# Patient Record
Sex: FEMALE | Race: WHITE | NOT HISPANIC OR LATINO | URBAN - METROPOLITAN AREA
[De-identification: names, ages, dates, MRNs, and addresses within clinical notes are randomized per-mention and may not be internally consistent; named-entity substitution may affect disease eponyms.]

---

## 2024-09-09 ENCOUNTER — APPOINTMENT (OUTPATIENT)
Dept: PRIMARY CARE | Facility: CLINIC | Age: 64
End: 2024-09-09

## 2024-09-09 VITALS
OXYGEN SATURATION: 100 % | HEART RATE: 58 BPM | SYSTOLIC BLOOD PRESSURE: 110 MMHG | HEIGHT: 68 IN | DIASTOLIC BLOOD PRESSURE: 60 MMHG | BODY MASS INDEX: 22.73 KG/M2 | WEIGHT: 150 LBS

## 2024-09-09 DIAGNOSIS — Z00.00 MEDICARE ANNUAL WELLNESS VISIT, INITIAL: Primary | ICD-10-CM

## 2024-09-09 DIAGNOSIS — D50.9 IRON DEFICIENCY ANEMIA, UNSPECIFIED IRON DEFICIENCY ANEMIA TYPE: Chronic | ICD-10-CM

## 2024-09-09 DIAGNOSIS — Z13.29 THYROID DISORDER SCREENING: ICD-10-CM

## 2024-09-09 DIAGNOSIS — M25.512 CHRONIC LEFT SHOULDER PAIN: ICD-10-CM

## 2024-09-09 DIAGNOSIS — I47.19 AVNRT (AV NODAL RE-ENTRY TACHYCARDIA) (CMS-HCC): Chronic | ICD-10-CM

## 2024-09-09 DIAGNOSIS — R25.2 LEG CRAMPS: Chronic | ICD-10-CM

## 2024-09-09 DIAGNOSIS — L65.9 HAIR LOSS: ICD-10-CM

## 2024-09-09 DIAGNOSIS — G89.29 CHRONIC LEFT SHOULDER PAIN: ICD-10-CM

## 2024-09-09 DIAGNOSIS — I47.9 PAROXYSMAL TACHYCARDIA (MULTI): ICD-10-CM

## 2024-09-09 DIAGNOSIS — R73.9 HYPERGLYCEMIA: ICD-10-CM

## 2024-09-09 DIAGNOSIS — R53.83 OTHER FATIGUE: ICD-10-CM

## 2024-09-09 LAB
BASOPHILS # BLD AUTO: 0.02 X10*3/UL (ref 0–0.1)
BASOPHILS NFR BLD AUTO: 0.3 %
EOSINOPHIL # BLD AUTO: 0.15 X10*3/UL (ref 0–0.7)
EOSINOPHIL NFR BLD AUTO: 2.5 %
ERYTHROCYTE [DISTWIDTH] IN BLOOD BY AUTOMATED COUNT: 12.8 % (ref 11.5–14.5)
EST. AVERAGE GLUCOSE BLD GHB EST-MCNC: 103 MG/DL
HBA1C MFR BLD: 5.2 %
HCT VFR BLD AUTO: 38 % (ref 36–46)
HGB BLD-MCNC: 12.3 G/DL (ref 12–16)
IMM GRANULOCYTES # BLD AUTO: 0.02 X10*3/UL (ref 0–0.7)
IMM GRANULOCYTES NFR BLD AUTO: 0.3 % (ref 0–0.9)
IRON SATN MFR SERPL: 23 % (ref 25–45)
IRON SERPL-MCNC: 68 UG/DL (ref 35–150)
LYMPHOCYTES # BLD AUTO: 1.62 X10*3/UL (ref 1.2–4.8)
LYMPHOCYTES NFR BLD AUTO: 27.4 %
MCH RBC QN AUTO: 30 PG (ref 26–34)
MCHC RBC AUTO-ENTMCNC: 32.4 G/DL (ref 32–36)
MCV RBC AUTO: 93 FL (ref 80–100)
MONOCYTES # BLD AUTO: 0.54 X10*3/UL (ref 0.1–1)
MONOCYTES NFR BLD AUTO: 9.1 %
NEUTROPHILS # BLD AUTO: 3.57 X10*3/UL (ref 1.2–7.7)
NEUTROPHILS NFR BLD AUTO: 60.4 %
NRBC BLD-RTO: 0 /100 WBCS (ref 0–0)
PLATELET # BLD AUTO: 200 X10*3/UL (ref 150–450)
RBC # BLD AUTO: 4.1 X10*6/UL (ref 4–5.2)
TIBC SERPL-MCNC: 299 UG/DL (ref 240–445)
TSH SERPL-ACNC: 0.67 MIU/L (ref 0.44–3.98)
UIBC SERPL-MCNC: 231 UG/DL (ref 110–370)
WBC # BLD AUTO: 5.9 X10*3/UL (ref 4.4–11.3)

## 2024-09-09 PROCEDURE — 84443 ASSAY THYROID STIM HORMONE: CPT

## 2024-09-09 PROCEDURE — 1160F RVW MEDS BY RX/DR IN RCRD: CPT | Performed by: STUDENT IN AN ORGANIZED HEALTH CARE EDUCATION/TRAINING PROGRAM

## 2024-09-09 PROCEDURE — 83550 IRON BINDING TEST: CPT

## 2024-09-09 PROCEDURE — 1036F TOBACCO NON-USER: CPT | Performed by: STUDENT IN AN ORGANIZED HEALTH CARE EDUCATION/TRAINING PROGRAM

## 2024-09-09 PROCEDURE — 85025 COMPLETE CBC W/AUTO DIFF WBC: CPT

## 2024-09-09 PROCEDURE — G0438 PPPS, INITIAL VISIT: HCPCS | Performed by: STUDENT IN AN ORGANIZED HEALTH CARE EDUCATION/TRAINING PROGRAM

## 2024-09-09 PROCEDURE — 3008F BODY MASS INDEX DOCD: CPT | Performed by: STUDENT IN AN ORGANIZED HEALTH CARE EDUCATION/TRAINING PROGRAM

## 2024-09-09 PROCEDURE — 83036 HEMOGLOBIN GLYCOSYLATED A1C: CPT

## 2024-09-09 PROCEDURE — 1124F ACP DISCUSS-NO DSCNMKR DOCD: CPT | Performed by: STUDENT IN AN ORGANIZED HEALTH CARE EDUCATION/TRAINING PROGRAM

## 2024-09-09 PROCEDURE — 99204 OFFICE O/P NEW MOD 45 MIN: CPT | Performed by: STUDENT IN AN ORGANIZED HEALTH CARE EDUCATION/TRAINING PROGRAM

## 2024-09-09 PROCEDURE — 1159F MED LIST DOCD IN RCRD: CPT | Performed by: STUDENT IN AN ORGANIZED HEALTH CARE EDUCATION/TRAINING PROGRAM

## 2024-09-09 PROCEDURE — 83540 ASSAY OF IRON: CPT

## 2024-09-09 PROCEDURE — 1170F FXNL STATUS ASSESSED: CPT | Performed by: STUDENT IN AN ORGANIZED HEALTH CARE EDUCATION/TRAINING PROGRAM

## 2024-09-09 RX ORDER — METOPROLOL SUCCINATE 25 MG/1
75 TABLET, EXTENDED RELEASE ORAL
COMMUNITY
Start: 2023-09-11

## 2024-09-09 RX ORDER — AMLODIPINE BESYLATE 5 MG/1
TABLET ORAL
COMMUNITY
Start: 2024-05-14

## 2024-09-09 RX ORDER — CYCLOBENZAPRINE HCL 5 MG
TABLET ORAL
COMMUNITY

## 2024-09-09 RX ORDER — FUROSEMIDE 20 MG/1
20 TABLET ORAL DAILY PRN
COMMUNITY

## 2024-09-09 RX ORDER — MELOXICAM 15 MG/1
15 TABLET ORAL
COMMUNITY
Start: 2024-06-10

## 2024-09-09 ASSESSMENT — ENCOUNTER SYMPTOMS
ARTHRALGIAS: 1
DIARRHEA: 0
SHORTNESS OF BREATH: 0
PALPITATIONS: 1
CHEST TIGHTNESS: 0
HEADACHES: 0
CONSTIPATION: 0
SLEEP DISTURBANCE: 0
NERVOUS/ANXIOUS: 0
DIFFICULTY URINATING: 0
UNEXPECTED WEIGHT CHANGE: 0
FATIGUE: 1
WHEEZING: 0
ABDOMINAL PAIN: 0
DYSPHORIC MOOD: 0
DIZZINESS: 0
SINUS PRESSURE: 0
LIGHT-HEADEDNESS: 0

## 2024-09-09 ASSESSMENT — PATIENT HEALTH QUESTIONNAIRE - PHQ9
1. LITTLE INTEREST OR PLEASURE IN DOING THINGS: NOT AT ALL
SUM OF ALL RESPONSES TO PHQ9 QUESTIONS 1 AND 2: 0
1. LITTLE INTEREST OR PLEASURE IN DOING THINGS: NOT AT ALL
2. FEELING DOWN, DEPRESSED OR HOPELESS: NOT AT ALL
2. FEELING DOWN, DEPRESSED OR HOPELESS: NOT AT ALL
SUM OF ALL RESPONSES TO PHQ9 QUESTIONS 1 AND 2: 0

## 2024-09-09 ASSESSMENT — ACTIVITIES OF DAILY LIVING (ADL)
TAKING_MEDICATION: INDEPENDENT
BATHING: INDEPENDENT
DRESSING: INDEPENDENT
DOING_HOUSEWORK: INDEPENDENT
GROCERY_SHOPPING: INDEPENDENT
MANAGING_FINANCES: INDEPENDENT

## 2024-09-09 NOTE — PROGRESS NOTES
Subjective   Patient ID: Giovanna Chahal is a 64 y.o. female who presents for Medicare Annual Wellness Visit Initial (Medicare annual wellness initial /New patient /Joint and back pain, fatigue/).  Establish care.     Complains of chronic joint pains and muscle aches. Takes mobic, tylenol, and cyclobenzaprine nightly.     Reports prior flu shot many years ago which caused nerve damage in her right shoulder. [facility]     Hx of AVNRT s/p ablation 2010. Stable on metropolol, has had this increased a few times.     [facility].     Reports prior low iron, was taking oral iron for about one week.     Does not sleep well. Secondary to pain.     Complains of hair falling out.     Drinks 3 eight oz glasses of water per day.     Works out 2x per week. Reports also walks her dog every day.     Has mammogram scheduled. Has colonoscopy scheduled.     Reports hx of mold poisoning from faucets at her condo.     No other concerns today.         Review of Systems   Constitutional:  Positive for fatigue. Negative for unexpected weight change.   HENT:  Negative for congestion, ear pain and sinus pressure.    Eyes:  Negative for visual disturbance.   Respiratory:  Negative for chest tightness, shortness of breath and wheezing.    Cardiovascular:  Positive for palpitations. Negative for chest pain and leg swelling.   Gastrointestinal:  Negative for abdominal pain, constipation and diarrhea.   Genitourinary:  Negative for difficulty urinating.   Musculoskeletal:  Positive for arthralgias.   Skin:  Negative for rash.   Neurological:  Negative for dizziness, light-headedness and headaches.   Psychiatric/Behavioral:  Negative for dysphoric mood and sleep disturbance. The patient is not nervous/anxious.    All other systems reviewed and are negative.      Objective   Physical Exam  Vitals reviewed.   Constitutional:       General: She is not in acute distress.  HENT:      Head:  Normocephalic.      Right Ear: External ear normal.      Left Ear: External ear normal.      Nose: Nose normal.   Eyes:      Extraocular Movements: Extraocular movements intact.      Pupils: Pupils are equal, round, and reactive to light.   Cardiovascular:      Rate and Rhythm: Normal rate and regular rhythm.      Heart sounds: Normal heart sounds.   Pulmonary:      Effort: Pulmonary effort is normal.      Breath sounds: Normal breath sounds.   Abdominal:      Palpations: Abdomen is soft.   Musculoskeletal:         General: Normal range of motion.      Cervical back: Normal range of motion and neck supple.   Skin:     General: Skin is warm and dry.   Neurological:      Mental Status: She is alert. Mental status is at baseline.   Psychiatric:         Mood and Affect: Mood normal.         Behavior: Behavior normal.         Body mass index is 22.81 kg/m².      Current Outpatient Medications:     amLODIPine (Norvasc) 5 mg tablet, Take by mouth., Disp: , Rfl:     cyclobenzaprine (Flexeril) 5 mg tablet, Take by mouth., Disp: , Rfl:     furosemide (Lasix) 20 mg tablet, Take 1 tablet (20 mg) by mouth once daily as needed., Disp: , Rfl:     meloxicam (Mobic) 15 mg tablet, Take 1 tablet (15 mg) by mouth once daily., Disp: , Rfl:     metoprolol succinate XL (Toprol-XL) 25 mg 24 hr tablet, Take 3 tablets (75 mg) by mouth., Disp: , Rfl:       Assessment/Plan   Diagnoses and all orders for this visit:  Medicare annual wellness visit, initial  Comments:  mammogram scheduled  colonoscopy scheduled  Chronic left shoulder pain  Comments:  referral for ortho for steroid injections  Orders:  -     Referral to Orthopaedic Surgery; Future  Paroxysmal tachycardia (Multi)  AVNRT (AV ramón re-entry tachycardia) (CMS-HCC)  Comments:  s/p ablation  concerned about her dose of 75mg metoprolol  reassurance provided  Iron deficiency anemia, unspecified iron deficiency anemia type  Comments:  check CBC and iron level  Orders:  -     Iron and  TIBC  -     CBC and Auto Differential  Other fatigue  -     TSH with reflex to Free T4 if abnormal  -     Hemoglobin A1c  Thyroid disorder screening  -     TSH with reflex to Free T4 if abnormal  Hair loss  Hyperglycemia  -     Hemoglobin A1c  Leg cramps  Comments:  check iron levels  encourage increasing hydration    Follow up in 6 months       Farida Diaz,  09/09/24 4:49 PM

## 2024-09-13 ENCOUNTER — HOSPITAL ENCOUNTER (OUTPATIENT)
Dept: RADIOLOGY | Facility: CLINIC | Age: 64
Discharge: HOME | End: 2024-09-13
Payer: COMMERCIAL

## 2024-09-13 VITALS — BODY MASS INDEX: 22.73 KG/M2 | HEIGHT: 68 IN | WEIGHT: 150 LBS

## 2024-09-13 DIAGNOSIS — Z12.31 SCREENING MAMMOGRAM FOR BREAST CANCER: ICD-10-CM

## 2024-09-13 PROCEDURE — 77067 SCR MAMMO BI INCL CAD: CPT

## 2024-09-15 ENCOUNTER — PATIENT MESSAGE (OUTPATIENT)
Dept: PRIMARY CARE | Facility: CLINIC | Age: 64
End: 2024-09-15
Payer: COMMERCIAL

## 2024-09-15 DIAGNOSIS — G89.29 CHRONIC LEFT SHOULDER PAIN: Primary | ICD-10-CM

## 2024-09-15 DIAGNOSIS — M25.512 CHRONIC LEFT SHOULDER PAIN: Primary | ICD-10-CM

## 2024-09-16 RX ORDER — MELOXICAM 15 MG/1
15 TABLET ORAL
Qty: 30 TABLET | Refills: 1 | Status: SHIPPED | OUTPATIENT
Start: 2024-09-16

## 2024-09-18 ENCOUNTER — HOSPITAL ENCOUNTER (OUTPATIENT)
Dept: RADIOLOGY | Facility: CLINIC | Age: 64
Discharge: HOME | End: 2024-09-18
Payer: COMMERCIAL

## 2024-09-18 ENCOUNTER — OFFICE VISIT (OUTPATIENT)
Dept: ORTHOPEDIC SURGERY | Facility: CLINIC | Age: 64
End: 2024-09-18
Payer: COMMERCIAL

## 2024-09-18 VITALS — BODY MASS INDEX: 21.98 KG/M2 | WEIGHT: 145 LBS | HEIGHT: 68 IN

## 2024-09-18 DIAGNOSIS — G89.29 CHRONIC LEFT SHOULDER PAIN: ICD-10-CM

## 2024-09-18 DIAGNOSIS — M75.42 ROTATOR CUFF IMPINGEMENT SYNDROME OF LEFT SHOULDER: Primary | ICD-10-CM

## 2024-09-18 DIAGNOSIS — M19.012 OSTEOARTHRITIS OF GLENOHUMERAL JOINT, LEFT: ICD-10-CM

## 2024-09-18 DIAGNOSIS — M25.512 ACUTE PAIN OF LEFT SHOULDER: ICD-10-CM

## 2024-09-18 DIAGNOSIS — M25.512 CHRONIC LEFT SHOULDER PAIN: ICD-10-CM

## 2024-09-18 PROCEDURE — 20610 DRAIN/INJ JOINT/BURSA W/O US: CPT | Mod: LT | Performed by: FAMILY MEDICINE

## 2024-09-18 PROCEDURE — 73030 X-RAY EXAM OF SHOULDER: CPT | Mod: LEFT SIDE | Performed by: RADIOLOGY

## 2024-09-18 PROCEDURE — 1036F TOBACCO NON-USER: CPT | Performed by: FAMILY MEDICINE

## 2024-09-18 PROCEDURE — 73030 X-RAY EXAM OF SHOULDER: CPT | Mod: LT

## 2024-09-18 PROCEDURE — 1159F MED LIST DOCD IN RCRD: CPT | Performed by: FAMILY MEDICINE

## 2024-09-18 PROCEDURE — 3008F BODY MASS INDEX DOCD: CPT | Performed by: FAMILY MEDICINE

## 2024-09-18 PROCEDURE — 99213 OFFICE O/P EST LOW 20 MIN: CPT | Mod: 25 | Performed by: FAMILY MEDICINE

## 2024-09-18 PROCEDURE — 99203 OFFICE O/P NEW LOW 30 MIN: CPT | Performed by: FAMILY MEDICINE

## 2024-09-18 PROCEDURE — 1160F RVW MEDS BY RX/DR IN RCRD: CPT | Performed by: FAMILY MEDICINE

## 2024-09-18 RX ORDER — TRIAMCINOLONE ACETONIDE 40 MG/ML
40 INJECTION, SUSPENSION INTRA-ARTICULAR; INTRAMUSCULAR
Status: COMPLETED | OUTPATIENT
Start: 2024-09-18 | End: 2024-09-18

## 2024-09-18 RX ORDER — ACETAMINOPHEN 500 MG
1000 TABLET ORAL DAILY
COMMUNITY

## 2024-09-18 RX ORDER — LIDOCAINE HYDROCHLORIDE 20 MG/ML
2 INJECTION, SOLUTION INFILTRATION; PERINEURAL
Status: COMPLETED | OUTPATIENT
Start: 2024-09-18 | End: 2024-09-18

## 2024-09-18 RX ORDER — MELATONIN 1 MG
TABLET,CHEWABLE ORAL
COMMUNITY

## 2024-09-18 NOTE — PROGRESS NOTES
History of Present Illness   Chief Complaint   Patient presents with    OTHER     LT SHOULDER PAIN FOR YEARS  NKI       The patient is 64 y.o. right-hand-dominant female  here with a complaint of left shoulder pain.  Patient referred by PCP Farida Diaz.  Patient has been dealing some chronic left shoulder pain for many years, symptoms initially started after a flu vaccine, did have some pretty severe pain following this which did improve with time but has intermittent shoulder pain since.  She has been following with outside [facility], she has been getting as needed corticosteroid injections in the left shoulder which have been of benefit though she does admit to some diminishing efficacy, previous injections were providing more than a year of relief, last injection was 7 months ago with more recent recurrence of pain.  She admits to pain with overhead activity, she has pain with abduction maneuvers, she has pain laying on her side at night.  She does some popping and clicking of the shoulder.  She has done physical therapy in the past.  She says that she has had MRI in the past of her left shoulder was told there was some degenerative changes.  Pain deep within the shoulder, no radiation of pain, no neck pain, no upper extremity numbness or tingling.    No past medical history on file.    Medication Documentation Review Audit       Reviewed by Farida Diaz DO (Physician) on 09/09/24 at 1416      Medication Order Taking? Sig Documenting Provider Last Dose Status   amLODIPine (Norvasc) 5 mg tablet [id number] Yes Take by mouth. Historical Provider, MD Taking Active   cyclobenzaprine (Flexeril) 5 mg tablet [id number] Yes Take by mouth. Historical Provider, MD Taking Active   furosemide (Lasix) 20 mg tablet [id number] Yes Take 1 tablet (20 mg) by mouth once daily as needed. Historical Provider, MD Taking Active   meloxicam (Mobic) 15 mg tablet [id number] Yes Take 1 tablet (15 mg)  by mouth once daily. Historical Provider, MD Taking Active   metoprolol succinate XL (Toprol-XL) 25 mg 24 hr tablet [id number] Yes Take 3 tablets (75 mg) by mouth. Historical Provider, MD Taking Active                    Allergies   Allergen Reactions    Lisinopril Cough    Influenza Virus Vaccine, Specific Other    Tetracyclines Other     experiences hearing loss    Penicillins Rash    Sulfamethoxazole-Trimethoprim Dermatitis, Rash and Other       Social History     Socioeconomic History    Marital status:      Spouse name: Not on file    Number of children: Not on file    Years of education: Not on file    Highest education level: Not on file   Occupational History    Not on file   Tobacco Use    Smoking status: Never    Smokeless tobacco: Never   Substance and Sexual Activity    Alcohol use: Not on file    Drug use: Not on file    Sexual activity: Not on file   Other Topics Concern    Not on file   Social History Narrative    Not on file     Social Determinants of Health     Financial Resource Strain: Not on file   Food Insecurity: Not on file   Transportation Needs: Not on file   Physical Activity: Not on file   Stress: Not on file   Social Connections: Not on file   Intimate Partner Violence: Not on file   Housing Stability: Not on file       No past surgical history on file.       Review of Systems   GENERAL: Negative  GI: Negative  MUSCULOSKELETAL: See HPI  SKIN: Negative  NEURO:  Negative     Physical Exam:    General/Constitutional: well appearing, no distress, appears stated age  HEENT: sclera clear  Respiratory: non labored breathing  Vascular: No edema, swelling or tenderness, except as noted in detailed exam.  Integumentary: No impressive skin lesions present, except as noted in detailed exam.  Neurological:  Alert and oriented   Psychological:  Normal mood and affect.  Musculoskeletal: Normal, except as noted in detailed exam and in HPI    Left shoulder: Normal appearance, no muscle atrophy, no  skin changes.  There is some mild tenderness at the subacromial space.  She has relatively preserved range of motion, equal to the right, forward flexion and abduction 160 degrees, internal rotation mid thoracic spine.  No significant motor deficits are present with rotator cuff strength testing which she does have pain with resisted abduction and external rotation.  She has pain with Hernandez and Neer's.  Positive [place].  Negative Speed, negative Yergason.  No shoulder instability.  There is some crepitus with passive range of motion of the shoulder.       Imaging: X-rays of left shoulder obtained today and independently reviewed, she does have some mild to moderate degenerative changes of the glenohumeral joint, no acute abnormalities are seen, mild degenerative changes of the AC joint      L Inj/Asp: L subacromial bursa on 9/18/2024 3:22 PM  Indications: pain  Details: 22 G needle, posterior approach  Medications: 40 mg triamcinolone acetonide 40 mg/mL; 2 mL lidocaine 20 mg/mL (2 %)  Outcome: tolerated well, no immediate complications  Procedure, treatment alternatives, risks and benefits explained, specific risks discussed. Consent was given by the patient. Immediately prior to procedure a time out was called to verify the correct patient, procedure, equipment, support staff and site/side marked as required. Patient was prepped and draped in the usual sterile fashion.             Assessment   1. Rotator cuff impingement syndrome of left shoulder        2. Osteoarthritis of glenohumeral joint, left        3. Chronic left shoulder pain  XR shoulder left 2+ views    Referral to Orthopaedic Surgery    referral for ortho for steroid injections            Plan: Chronic left shoulder pain, symptoms thought to be secondary to commendation of underlying osteoarthritis of the glenohumeral joint, also has some signs of rotator cuff impingement.  Has had good response to previous shoulder injections which sound like  subacromial injections under palpation guidance, somewhat diminishing response to these injections, last injection 7 months ago.  We discussed further workup and treatment.  We did proceed with subacromial injection with Kenalog today.  She has done physical therapy in the past, she will continue with her home exercises.  I would like to see her back in 1 month for reassessment.  Could consider ultrasound-guided glenohumeral joint injection at some point.

## 2024-09-20 DIAGNOSIS — R92.8 ABNORMAL MAMMOGRAM: Primary | ICD-10-CM

## 2024-09-20 NOTE — PROGRESS NOTES
Subjective   Patient ID: Giovanna Chahal is a 64 y.o. female who presents for No chief complaint on file..  HPI    Review of Systems    Objective   Physical Exam    Assessment/Plan            Farida Diaz DO 09/20/24 3:46 PM

## 2024-10-16 ENCOUNTER — OFFICE VISIT (OUTPATIENT)
Dept: ORTHOPEDIC SURGERY | Facility: CLINIC | Age: 64
End: 2024-10-16
Payer: COMMERCIAL

## 2024-10-16 DIAGNOSIS — M19.012 OSTEOARTHRITIS OF GLENOHUMERAL JOINT, LEFT: ICD-10-CM

## 2024-10-16 DIAGNOSIS — M75.42 ROTATOR CUFF IMPINGEMENT SYNDROME OF LEFT SHOULDER: Primary | ICD-10-CM

## 2024-10-16 PROCEDURE — 1036F TOBACCO NON-USER: CPT | Performed by: FAMILY MEDICINE

## 2024-10-16 PROCEDURE — 1159F MED LIST DOCD IN RCRD: CPT | Performed by: FAMILY MEDICINE

## 2024-10-16 PROCEDURE — 99213 OFFICE O/P EST LOW 20 MIN: CPT | Performed by: FAMILY MEDICINE

## 2024-10-16 PROCEDURE — 1160F RVW MEDS BY RX/DR IN RCRD: CPT | Performed by: FAMILY MEDICINE

## 2024-10-16 NOTE — PROGRESS NOTES
History of Present Illness   Chief Complaint   Patient presents with    Left Shoulder - Follow-up       The patient is 64 y.o. right-hand-dominant female  here for follow-up of left shoulder pain.  Patient was seen by me 1 month ago.  See previous note for full details of history.  In brief patient has been dealing with some chronic left shoulder pain for several years, was [facility]  Treatment has been conservative with as needed corticosteroid injections with somewhat diminishing response.  At her initial visit she had x-rays that showed some moderate degenerative changes of the glenohumeral joint.  Symptoms thought to be multifactorial commendation of arthritic changes of the shoulder as well as some rotator cuff impingement.  We opted to treat with subacromial injection at that visit.  Patient says that she has had near full relief of symptoms, says she is doing well today with no current shoulder pain.  She is planning to get back into her weight training routine, says she does typically modify her shoulder workouts because of concern for aggravation of shoulder pain.  She has done physical therapy in the past as well, she does do some home exercises for her shoulder.      No past medical history on file.    Medication Documentation Review Audit       Reviewed by Perico Pereira MD (Physician) on 09/18/24 at 1522      Medication Order Taking? Sig Documenting Provider Last Dose Status   amLODIPine (Norvasc) 5 mg tablet [id number] Yes Take by mouth. Historical Provider, MD Taking Active   cyclobenzaprine (Flexeril) 5 mg tablet [id number] Yes Take by mouth. Historical Provider, MD Taking Active   furosemide (Lasix) 20 mg tablet [id number]  Take 1 tablet (20 mg) by mouth once daily as needed. Historical Provider, MD  Active   melatonin 1 mg tablet,chewable [id number] Yes Chew. Historical Provider, MD Taking Active     Discontinued 09/16/24 [id number]   meloxicam (Mobic) 15 mg  tablet [id number] Yes Take 1 tablet (15 mg) by mouth once daily. Farida Diaz,  Taking Active   metoprolol succinate XL (Toprol-XL) 25 mg 24 hr tablet [id number] Yes Take 3 tablets (75 mg) by mouth. Historical Provider, MD Taking Active   multivit with minerals-folic acid-denise K-CoQ (GASTON Plus) 200 mcg-1,000 mcg-10 mg capsule [id number] Yes Take 1 capsule by mouth once daily. Historical Provider, MD Taking Active   omega-3 (Fish OiL) 300-1,000 mg capsule [id number] Yes Take 1 capsule (1,000 mg) by mouth once daily. Historical Provider, MD Taking Active                    Allergies   Allergen Reactions    Lisinopril Cough    Influenza Virus Vaccine, Specific Other    Tetracyclines Other     experiences hearing loss    Penicillins Rash    Sulfamethoxazole-Trimethoprim Dermatitis, Rash and Other       Social History     Socioeconomic History    Marital status:      Spouse name: Not on file    Number of children: Not on file    Years of education: Not on file    Highest education level: Not on file   Occupational History    Not on file   Tobacco Use    Smoking status: Never    Smokeless tobacco: Never   Substance and Sexual Activity    Alcohol use: Not on file    Drug use: Not on file    Sexual activity: Not on file   Other Topics Concern    Not on file   Social History Narrative    Not on file     Social Drivers of Health     Financial Resource Strain: Not on file   Food Insecurity: Not on file   Transportation Needs: Not on file   Physical Activity: Not on file   Stress: Not on file   Social Connections: Not on file   Intimate Partner Violence: Not on file   Housing Stability: Not on file       No past surgical history on file.       Review of Systems   GENERAL: Negative  GI: Negative  MUSCULOSKELETAL: See HPI  SKIN: Negative  NEURO:  Negative     Physical Exam:    General/Constitutional: well appearing, no distress, appears stated age  HEENT: sclera clear  Respiratory: non labored breathing  Vascular: No  edema, swelling or tenderness, except as noted in detailed exam.  Integumentary: No impressive skin lesions present, except as noted in detailed exam.  Neurological:  Alert and oriented   Psychological:  Normal mood and affect.  Musculoskeletal: Normal, except as noted in detailed exam and in HPI    Left shoulder: Normal appearance, no muscle atrophy, no skin changes.  There is no tenderness at the subacromial space.  She has relatively preserved range of motion, equal to the right, forward flexion and abduction 160 degrees, internal rotation mid thoracic spine.  No pain or weakness with rotator cuff strength testing in any direction.  Negative Hernandez, negative Neer's, negative [place].  Negative Speed, negative Yergason.  No shoulder instability.  There is some crepitus with passive range of motion of the shoulder.    No new imaging today        Assessment   1. Rotator cuff impingement syndrome of left shoulder        2. Osteoarthritis of glenohumeral joint, left                Plan: Good response to subacromial injection with no residual pain in the left shoulder on today's visit.  Recommended continued conservative management.  She will return back to her weight training as tolerated by symptoms, avoiding aggravating exercises in the gym with regards to her shoulder.  We discussed treatment moving forward including as needed corticosteroid injection pending response to this injection though she has had many injections over the past few years.  Could consider other treatments including possible PRP.  We discussed consideration of updated MRI at some point, has been more than 5 years since her last MRI, no reports are available for review.  Patient will follow-up as symptoms dictate.

## 2024-10-29 ENCOUNTER — HOSPITAL ENCOUNTER (OUTPATIENT)
Dept: RADIOLOGY | Facility: CLINIC | Age: 64
Discharge: HOME | End: 2024-10-29
Payer: COMMERCIAL

## 2024-10-29 DIAGNOSIS — R92.8 ABNORMAL MAMMOGRAM: ICD-10-CM

## 2024-10-29 PROCEDURE — 77061 BREAST TOMOSYNTHESIS UNI: CPT | Mod: LT

## 2024-10-29 PROCEDURE — 77065 DX MAMMO INCL CAD UNI: CPT | Mod: LEFT SIDE | Performed by: STUDENT IN AN ORGANIZED HEALTH CARE EDUCATION/TRAINING PROGRAM

## 2024-10-29 PROCEDURE — G0279 TOMOSYNTHESIS, MAMMO: HCPCS | Mod: LEFT SIDE | Performed by: STUDENT IN AN ORGANIZED HEALTH CARE EDUCATION/TRAINING PROGRAM

## 2024-11-17 ENCOUNTER — PATIENT MESSAGE (OUTPATIENT)
Dept: PRIMARY CARE | Facility: CLINIC | Age: 64
End: 2024-11-17
Payer: COMMERCIAL

## 2024-11-17 DIAGNOSIS — I10 PRIMARY HYPERTENSION: Primary | ICD-10-CM

## 2024-11-18 RX ORDER — AMLODIPINE BESYLATE 5 MG/1
5 TABLET ORAL DAILY
Qty: 90 TABLET | Refills: 3 | Status: SHIPPED | OUTPATIENT
Start: 2024-11-18

## 2024-12-01 ENCOUNTER — PATIENT MESSAGE (OUTPATIENT)
Dept: PRIMARY CARE | Facility: CLINIC | Age: 64
End: 2024-12-01
Payer: COMMERCIAL

## 2024-12-01 DIAGNOSIS — I10 PRIMARY HYPERTENSION: Primary | ICD-10-CM

## 2024-12-02 RX ORDER — METOPROLOL SUCCINATE 25 MG/1
75 TABLET, EXTENDED RELEASE ORAL DAILY
Qty: 270 TABLET | Refills: 3 | Status: SHIPPED | OUTPATIENT
Start: 2024-12-02

## 2025-01-09 ENCOUNTER — PATIENT MESSAGE (OUTPATIENT)
Dept: PRIMARY CARE | Facility: CLINIC | Age: 65
End: 2025-01-09
Payer: COMMERCIAL

## 2025-01-09 DIAGNOSIS — M25.512 CHRONIC LEFT SHOULDER PAIN: ICD-10-CM

## 2025-01-09 DIAGNOSIS — G89.29 CHRONIC LEFT SHOULDER PAIN: ICD-10-CM

## 2025-01-09 RX ORDER — CYCLOBENZAPRINE HCL 5 MG
2.5 TABLET ORAL DAILY
Qty: 30 TABLET | Refills: 0 | Status: SHIPPED | OUTPATIENT
Start: 2025-01-09

## 2025-01-09 NOTE — PROGRESS NOTES
Subjective   Patient ID: Giovanna Chahal is a 64 y.o. female who presents for No chief complaint on file..  HPI    Review of Systems    Objective   Physical Exam    Assessment/Plan            Farida Diaz DO 01/09/25 4:39 PM

## 2025-02-05 ENCOUNTER — OFFICE VISIT (OUTPATIENT)
Dept: ORTHOPEDIC SURGERY | Facility: CLINIC | Age: 65
End: 2025-02-05
Payer: MEDICARE

## 2025-02-05 DIAGNOSIS — G89.29 CHRONIC LEFT SHOULDER PAIN: ICD-10-CM

## 2025-02-05 DIAGNOSIS — M25.512 CHRONIC LEFT SHOULDER PAIN: ICD-10-CM

## 2025-02-05 DIAGNOSIS — M75.42 ROTATOR CUFF IMPINGEMENT SYNDROME OF LEFT SHOULDER: Primary | ICD-10-CM

## 2025-02-05 PROCEDURE — 1160F RVW MEDS BY RX/DR IN RCRD: CPT | Performed by: FAMILY MEDICINE

## 2025-02-05 PROCEDURE — 99213 OFFICE O/P EST LOW 20 MIN: CPT | Mod: 25 | Performed by: FAMILY MEDICINE

## 2025-02-05 PROCEDURE — 1036F TOBACCO NON-USER: CPT | Performed by: FAMILY MEDICINE

## 2025-02-05 PROCEDURE — 20610 DRAIN/INJ JOINT/BURSA W/O US: CPT | Mod: LT | Performed by: FAMILY MEDICINE

## 2025-02-05 PROCEDURE — 1159F MED LIST DOCD IN RCRD: CPT | Performed by: FAMILY MEDICINE

## 2025-02-05 PROCEDURE — 2500000004 HC RX 250 GENERAL PHARMACY W/ HCPCS (ALT 636 FOR OP/ED): Performed by: FAMILY MEDICINE

## 2025-02-05 PROCEDURE — 99213 OFFICE O/P EST LOW 20 MIN: CPT | Performed by: FAMILY MEDICINE

## 2025-02-05 RX ORDER — TRIAMCINOLONE ACETONIDE 40 MG/ML
40 INJECTION, SUSPENSION INTRA-ARTICULAR; INTRAMUSCULAR
Status: COMPLETED | OUTPATIENT
Start: 2025-02-05 | End: 2025-02-05

## 2025-02-05 RX ORDER — LIDOCAINE HYDROCHLORIDE 20 MG/ML
2 INJECTION, SOLUTION INFILTRATION; PERINEURAL
Status: COMPLETED | OUTPATIENT
Start: 2025-02-05 | End: 2025-02-05

## 2025-02-05 RX ADMIN — TRIAMCINOLONE ACETONIDE 40 MG: 40 INJECTION, SUSPENSION INTRA-ARTICULAR; INTRAMUSCULAR at 11:38

## 2025-02-05 RX ADMIN — LIDOCAINE HYDROCHLORIDE 2 ML: 20 INJECTION, SOLUTION INFILTRATION; PERINEURAL at 11:38

## 2025-02-05 NOTE — PROGRESS NOTES
History of Present Illness   Chief Complaint   Patient presents with    Left Shoulder - Follow-up       The patient is 64 y.o. right-hand-dominant female  here for follow-up of left shoulder pain.  Patient was last seen by me 5 months ago, see previous note for full details.  Patient with history of some chronic left shoulder pain following with the [facility] in the past, she has had x-rays that did show some degenerative changes of the glenohumeral joint however symptoms more consistent with rotator cuff impingement, she did have subacromial injection with me in September, at 1 month follow-up she was doing well.  She has had several prior shoulder injections likely thought to be were subacromial injections in the past that have provided good but short-term relief, she has done physical therapy in the past as well.  She has had recurrence of pain over the past 6 weeks or so which she attributes to shoveling snow.  Similar to previous shoulder pain.  Pain over anterior lateral shoulder and upper arm, she has pain with overhead activity specifically with shoulder abduction, reaching behind her back, some pain laying on her shoulder.  No new injuries or symptoms since her last visit.  She states that she did have an MRI done at least 5 years ago that she said showed some degenerative changes, no definitive rotator cuff tear that she can recall.      No past medical history on file.    Medication Documentation Review Audit       Reviewed by Perico Pereira MD (Physician) on 10/16/24 at 0825      Medication Order Taking? Sig Documenting Provider Last Dose Status   amLODIPine (Norvasc) 5 mg tablet [id number] No Take by mouth. Historical Provider, MD Taking Active   cyclobenzaprine (Flexeril) 5 mg tablet [id number] No Take by mouth. Historical Provider, MD Taking Active   furosemide (Lasix) 20 mg tablet [id number] No Take 1 tablet (20 mg) by mouth once daily as needed. Historical Provider, MD Taking Active   melatonin 1  mg tablet,chewable [id number] No Chew. Historical Provider, MD Taking Active   meloxicam (Mobic) 15 mg tablet [id number] No Take 1 tablet (15 mg) by mouth once daily. Farida Diaz DO Taking Active   metoprolol succinate XL (Toprol-XL) 25 mg 24 hr tablet [id number] No Take 3 tablets (75 mg) by mouth. Historical Provider, MD Taking Active   multivit with minerals-folic acid-denise K-CoQ (GASTON Plus) 200 mcg-1,000 mcg-10 mg capsule [id number] No Take 1 capsule by mouth once daily. Historical Provider, MD Taking Active   omega-3 (Fish OiL) 300-1,000 mg capsule [id number] No Take 1 capsule (1,000 mg) by mouth once daily. Historical Provider, MD Taking Active                    Allergies   Allergen Reactions    Lisinopril Cough    Influenza Virus Vaccine, Specific Other    Tetracyclines Other     experiences hearing loss    Penicillins Rash    Sulfamethoxazole-Trimethoprim Dermatitis, Rash and Other       Social History     Socioeconomic History    Marital status:      Spouse name: Not on file    Number of children: Not on file    Years of education: Not on file    Highest education level: Not on file   Occupational History    Not on file   Tobacco Use    Smoking status: Never    Smokeless tobacco: Never   Substance and Sexual Activity    Alcohol use: Not on file    Drug use: Not on file    Sexual activity: Not on file   Other Topics Concern    Not on file   Social History Narrative    Not on file     Social Drivers of Health     Financial Resource Strain: Not on file   Food Insecurity: Not on file   Transportation Needs: Not on file   Physical Activity: Not on file   Stress: Not on file   Social Connections: Not on file   Intimate Partner Violence: Not on file   Housing Stability: Not on file       No past surgical history on file.       Review of Systems   GENERAL: Negative  GI: Negative  MUSCULOSKELETAL: See HPI  SKIN: Negative  NEURO:  Negative     Physical Exam:    General/Constitutional: well appearing, no  distress, appears stated age  HEENT: sclera clear  Respiratory: non labored breathing  Vascular: No edema, swelling or tenderness, except as noted in detailed exam.  Integumentary: No impressive skin lesions present, except as noted in detailed exam.  Neurological:  Alert and oriented   Psychological:  Normal mood and affect.  Musculoskeletal: Normal, except as noted in detailed exam and in HPI    Left shoulder: Normal appearance, no muscle atrophy, no skin changes.  There is no tenderness at the subacromial space.  Limited active range of motion, forward flexion 140 degrees, abduction 110 degrees with pain.  Internal rotation lumbar spine.  No passive range of motion deficits are present but there is pain and guarding at endrange.  No significant rotator cuff weakness, there is pain with resisted abduction.  There is pain with Hernandez and Neer's test.  Negative Speed, negative Yergason.      No new imaging today        L Inj/Asp: L subacromial bursa on 2/5/2025 11:38 AM  Indications: pain  Details: 22 G needle, posterior approach  Medications: 40 mg triamcinolone acetonide 40 mg/mL; 2 mL lidocaine 20 mg/mL (2 %)  Outcome: tolerated well, no immediate complications  Procedure, treatment alternatives, risks and benefits explained, specific risks discussed. Consent was given by the patient. Immediately prior to procedure a time out was called to verify the correct patient, procedure, equipment, support staff and site/side marked as required. Patient was prepped and draped in the usual sterile fashion.             Assessment   1. Rotator cuff impingement syndrome of left shoulder  Point of Care Ultrasound      2. Chronic left shoulder pain                Plan: Discussed further workup and treatment with patient, she was interested in repeat Kenalog injection which we did proceed with, see procedure note for full details.  We did again discuss potential risk of repetitive corticosteroid injections with regards to  weakening of the rotator cuff tendon.  We discussed possible MRI for further evaluation of the shoulder at some point, she would like to wait till later in the year until potentially more of her deductible has been paid.  She will continue with exercises at home, activities as tolerated, she will follow-up as needed.    ADDENDUM: Patient reached out to the office after visit stating that she would like to pursue shoulder MRI at this time which I think is reasonable given ongoing symptoms despite conservative management, order was placed for MRI, she will call to schedule, further treatment pending MRI results.

## 2025-02-06 DIAGNOSIS — M25.512 CHRONIC LEFT SHOULDER PAIN: Primary | ICD-10-CM

## 2025-02-06 DIAGNOSIS — M75.42 ROTATOR CUFF IMPINGEMENT SYNDROME OF LEFT SHOULDER: ICD-10-CM

## 2025-02-06 DIAGNOSIS — G89.29 CHRONIC LEFT SHOULDER PAIN: Primary | ICD-10-CM

## 2025-02-15 DIAGNOSIS — G89.29 CHRONIC LEFT SHOULDER PAIN: ICD-10-CM

## 2025-02-15 DIAGNOSIS — M25.512 CHRONIC LEFT SHOULDER PAIN: ICD-10-CM

## 2025-02-17 RX ORDER — MELOXICAM 15 MG/1
15 TABLET ORAL DAILY
Qty: 30 TABLET | Refills: 1 | Status: SHIPPED | OUTPATIENT
Start: 2025-02-17

## 2025-02-20 ENCOUNTER — HOSPITAL ENCOUNTER (OUTPATIENT)
Dept: RADIOLOGY | Facility: CLINIC | Age: 65
Discharge: HOME | End: 2025-02-20
Payer: MEDICARE

## 2025-02-20 DIAGNOSIS — G89.29 CHRONIC LEFT SHOULDER PAIN: ICD-10-CM

## 2025-02-20 DIAGNOSIS — M75.42 ROTATOR CUFF IMPINGEMENT SYNDROME OF LEFT SHOULDER: ICD-10-CM

## 2025-02-20 DIAGNOSIS — M25.512 CHRONIC LEFT SHOULDER PAIN: ICD-10-CM

## 2025-02-20 PROCEDURE — 73221 MRI JOINT UPR EXTREM W/O DYE: CPT | Mod: LT

## 2025-03-05 ENCOUNTER — HOSPITAL ENCOUNTER (OUTPATIENT)
Dept: RADIOLOGY | Facility: EXTERNAL LOCATION | Age: 65
Discharge: HOME | End: 2025-03-05

## 2025-03-05 ENCOUNTER — OFFICE VISIT (OUTPATIENT)
Dept: ORTHOPEDIC SURGERY | Facility: CLINIC | Age: 65
End: 2025-03-05
Payer: MEDICARE

## 2025-03-05 DIAGNOSIS — M25.512 LEFT SHOULDER PAIN, UNSPECIFIED CHRONICITY: ICD-10-CM

## 2025-03-05 DIAGNOSIS — M75.42 ROTATOR CUFF IMPINGEMENT SYNDROME OF LEFT SHOULDER: ICD-10-CM

## 2025-03-05 DIAGNOSIS — M19.012 OSTEOARTHRITIS OF GLENOHUMERAL JOINT, LEFT: Primary | ICD-10-CM

## 2025-03-05 PROCEDURE — 20611 DRAIN/INJ JOINT/BURSA W/US: CPT | Mod: LT | Performed by: FAMILY MEDICINE

## 2025-03-05 PROCEDURE — 99214 OFFICE O/P EST MOD 30 MIN: CPT | Performed by: FAMILY MEDICINE

## 2025-03-05 PROCEDURE — 99214 OFFICE O/P EST MOD 30 MIN: CPT | Mod: 25 | Performed by: FAMILY MEDICINE

## 2025-03-05 PROCEDURE — 1160F RVW MEDS BY RX/DR IN RCRD: CPT | Performed by: FAMILY MEDICINE

## 2025-03-05 PROCEDURE — 2500000004 HC RX 250 GENERAL PHARMACY W/ HCPCS (ALT 636 FOR OP/ED): Performed by: FAMILY MEDICINE

## 2025-03-05 PROCEDURE — 1159F MED LIST DOCD IN RCRD: CPT | Performed by: FAMILY MEDICINE

## 2025-03-05 PROCEDURE — 1036F TOBACCO NON-USER: CPT | Performed by: FAMILY MEDICINE

## 2025-03-05 RX ORDER — LIDOCAINE HYDROCHLORIDE 20 MG/ML
2 INJECTION, SOLUTION INFILTRATION; PERINEURAL
Status: COMPLETED | OUTPATIENT
Start: 2025-03-05 | End: 2025-03-05

## 2025-03-05 RX ORDER — TRIAMCINOLONE ACETONIDE 40 MG/ML
40 INJECTION, SUSPENSION INTRA-ARTICULAR; INTRAMUSCULAR
Status: COMPLETED | OUTPATIENT
Start: 2025-03-05 | End: 2025-03-05

## 2025-03-05 RX ADMIN — TRIAMCINOLONE ACETONIDE 40 MG: 40 INJECTION, SUSPENSION INTRA-ARTICULAR; INTRAMUSCULAR at 08:42

## 2025-03-05 RX ADMIN — LIDOCAINE HYDROCHLORIDE 2 ML: 20 INJECTION, SOLUTION INFILTRATION; PERINEURAL at 08:42

## 2025-03-05 NOTE — PROGRESS NOTES
History of Present Illness   Chief Complaint   Patient presents with    Left Shoulder - Follow-up     MRI DISCUSSION       The patient is 64 y.o. right-hand-dominant female  here for follow-up of left shoulder pain/MRI review.  See previous notes for full details of history.  In brief patient has been in with some chronic left shoulder pain over the past several years [facility], had been diagnosed with rotator cuff impingement and was getting as needed subacromial injections over the years with good relief.  She did have x-rays that showed some glenohumeral joint degenerative changes but symptoms thought to be more rotator cuff in nature.  She did have initial injection with me in September that provided good relief for around 4 to 5 months.  Most recent follow-up with me last month we recommended MRI for further evaluation of rotator cuff and glenohumeral joint, we did proceed with repeat subacromial injection at that time.  She says that injection was of good benefit however previous injections had led to full resolution of pain, says she is still having some very mild lingering discomfort in her shoulder.  She denies any new injuries or symptoms.  She has not yet returned to her workout activities, typically after injection she can go back to her weight training but has been somewhat fearful because of her mild lingering pain.  She admits to some mild popping and clicking of the shoulder.  She has not physical therapy for her shoulder in the past, has been several years since this but felt like in the past that it seemed to aggravate her shoulder more than anything.          No past medical history on file.    Medication Documentation Review Audit       Reviewed by Perico Pereira MD (Physician) on 02/05/25 at 1139      Medication Order Taking? Sig Documenting Provider Last Dose Status   amLODIPine (Norvasc) 5 mg tablet [id number]  Take 1 tablet (5 mg) by mouth once daily.  Farida Diaz, DO  Active   cyclobenzaprine (Flexeril) 5 mg tablet [id number]  Take 0.5 tablets (2.5 mg) by mouth once daily. Farida Diaz DO  Active   furosemide (Lasix) 20 mg tablet [id number] No Take 1 tablet (20 mg) by mouth once daily as needed. Historical Provider, MD Taking Active   melatonin 1 mg tablet,chewable [id number] No Chew. Historical Provider, MD Taking Active   meloxicam (Mobic) 15 mg tablet [id number] No Take 1 tablet (15 mg) by mouth once daily. Farida Diaz DO Taking Active   metoprolol succinate XL (Toprol-XL) 25 mg 24 hr tablet [id number]  Take 3 tablets (75 mg) by mouth once daily. Farida Diaz DO  Active   multivit with minerals-folic acid-denise K-CoQ (GASTON Plus) 200 mcg-1,000 mcg-10 mg capsule [id number] No Take 1 capsule by mouth once daily. Historical Provider, MD Taking Active   omega-3 (Fish OiL) 300-1,000 mg capsule [id number] No Take 1 capsule (1,000 mg) by mouth once daily. Historical Provider, MD Taking Active                    Allergies   Allergen Reactions    Lisinopril Cough    Influenza Virus Vaccine, Specific Other    Tetracyclines Other     experiences hearing loss    Penicillins Rash    Sulfamethoxazole-Trimethoprim Dermatitis, Rash and Other       Social History     Socioeconomic History    Marital status:      Spouse name: Not on file    Number of children: Not on file    Years of education: Not on file    Highest education level: Not on file   Occupational History    Not on file   Tobacco Use    Smoking status: Never    Smokeless tobacco: Never   Substance and Sexual Activity    Alcohol use: Not on file    Drug use: Not on file    Sexual activity: Not on file   Other Topics Concern    Not on file   Social History Narrative    Not on file     Social Drivers of Health     Financial Resource Strain: Not on file   Food Insecurity: Not on file   Transportation Needs: Not on file   Physical Activity: Not on file   Stress: Not on file   Social Connections: Not on  file   Intimate Partner Violence: Not on file   Housing Stability: Not on file       No past surgical history on file.       Review of Systems   GENERAL: Negative  GI: Negative  MUSCULOSKELETAL: See HPI  SKIN: Negative  NEURO:  Negative     Physical Exam:    General/Constitutional: well appearing, no distress, appears stated age  HEENT: sclera clear  Respiratory: non labored breathing  Vascular: No edema, swelling or tenderness, except as noted in detailed exam.  Integumentary: No impressive skin lesions present, except as noted in detailed exam.  Neurological:  Alert and oriented   Psychological:  Normal mood and affect.  Musculoskeletal: Normal, except as noted in detailed exam and in HPI    Left shoulder: Normal appearance, no muscle atrophy, no skin changes.  There is no tenderness at the subacromial space.  Active range of motion is improved over the past month, she notes full range of motion with forward flexion and abduction to 170 degrees, mild pain at endrange, internal rotation lower thoracic spine.  No significant rotator cuff weakness, there is pain mild with resisted abduction.  Minimal discomfort with Hernandez and Neer's..  Negative Speed, negative Yergason.  There is some mild crepitus with passive range of motion      Imaging: MRI of shoulder was reviewed, there is a low-grade partial-thickness tear of the distal infraspinatus, there is moderate to advanced degenerative changes of the glenohumeral joint, there is some degenerative labral tearing        L Inj/Asp: L glenohumeral on 3/5/2025 8:42 AM  Indications: pain  Details: 22 G needle, ultrasound-guided posterior approach  Medications: 40 mg triamcinolone acetonide 40 mg/mL; 2 mL lidocaine 20 mg/mL (2 %)  Outcome: tolerated well, no immediate complications    Left glenohumeral joint and surrounding structures were appropriately visualized before and during injection    Ultrasound images were permanently uploaded to the medical  record/PACS  Procedure, treatment alternatives, risks and benefits explained, specific risks discussed. Consent was given by the patient. Immediately prior to procedure a time out was called to verify the correct patient, procedure, equipment, support staff and site/side marked as required. Patient was prepped and draped in the usual sterile fashion.             Assessment   1. Osteoarthritis of glenohumeral joint, left        2. Rotator cuff impingement syndrome of left shoulder        3. Left shoulder pain, unspecified chronicity  Point of Care Ultrasound              Plan: Discussed diagnosis, MRI images were reviewed with patient.  Somewhat of an interesting case, has been having good, consistent response to subacromial injections, MRI shows small tear of distal infraspinatus.  She has more pathology within the glenohumeral joint with moderate to advanced degenerative changes, she has full range of motion at the shoulder, mild crepitus.  Discussed further workup and treatment.  She had injection 1 month ago and has improved but still having pain which is more mild at this time.  We opted to proceed with ultrasound-guided glenohumeral joint injection for both diagnostic and therapeutic purposes.  She can resume activity as tolerated by symptoms.  She is going to reach out via [employer] with update on response to glenohumeral joint injection.  No indication for any surgical intervention based on MRI findings

## 2025-03-11 DIAGNOSIS — M25.512 CHRONIC LEFT SHOULDER PAIN: ICD-10-CM

## 2025-03-11 DIAGNOSIS — G89.29 CHRONIC LEFT SHOULDER PAIN: ICD-10-CM

## 2025-03-11 RX ORDER — CYCLOBENZAPRINE HCL 5 MG
TABLET ORAL
Qty: 30 TABLET | Refills: 3 | Status: SHIPPED | OUTPATIENT
Start: 2025-03-11

## 2025-03-18 ASSESSMENT — ENCOUNTER SYMPTOMS
FLANK PAIN: 0
CHILLS: 0
DYSURIA: 1
VOMITING: 0
SWEATS: 0
HEMATURIA: 0
FREQUENCY: 1
NAUSEA: 0

## 2025-03-19 ENCOUNTER — OFFICE VISIT (OUTPATIENT)
Dept: PRIMARY CARE | Facility: CLINIC | Age: 65
End: 2025-03-19
Payer: MEDICARE

## 2025-03-19 VITALS
OXYGEN SATURATION: 98 % | HEIGHT: 68 IN | WEIGHT: 148 LBS | HEART RATE: 58 BPM | SYSTOLIC BLOOD PRESSURE: 120 MMHG | DIASTOLIC BLOOD PRESSURE: 70 MMHG | BODY MASS INDEX: 22.43 KG/M2

## 2025-03-19 DIAGNOSIS — I48.20 CHRONIC ATRIAL FIBRILLATION (MULTI): ICD-10-CM

## 2025-03-19 DIAGNOSIS — R39.9 UTI SYMPTOMS: ICD-10-CM

## 2025-03-19 LAB
APPEARANCE UR: ABNORMAL
BILIRUB UR QL STRIP: NEGATIVE
COLOR UR: ABNORMAL
GLUCOSE UR STRIP-MCNC: NEGATIVE MG/DL
HGB UR QL STRIP: ABNORMAL
KETONES UR STRIP-MCNC: NEGATIVE MG/DL
LEUKOCYTE ESTERASE UR QL STRIP: ABNORMAL
NITRITE UR QL STRIP: NEGATIVE
PH UR STRIP: 5.5 [PH]
PROT UR STRIP-MCNC: NEGATIVE MG/DL
SP GR UR STRIP.AUTO: 1.01
UROBILINOGEN UR STRIP-ACNC: 0.2 E.U./DL

## 2025-03-19 PROCEDURE — 1036F TOBACCO NON-USER: CPT | Performed by: STUDENT IN AN ORGANIZED HEALTH CARE EDUCATION/TRAINING PROGRAM

## 2025-03-19 PROCEDURE — 1160F RVW MEDS BY RX/DR IN RCRD: CPT | Performed by: STUDENT IN AN ORGANIZED HEALTH CARE EDUCATION/TRAINING PROGRAM

## 2025-03-19 PROCEDURE — 3008F BODY MASS INDEX DOCD: CPT | Performed by: STUDENT IN AN ORGANIZED HEALTH CARE EDUCATION/TRAINING PROGRAM

## 2025-03-19 PROCEDURE — 99213 OFFICE O/P EST LOW 20 MIN: CPT | Performed by: STUDENT IN AN ORGANIZED HEALTH CARE EDUCATION/TRAINING PROGRAM

## 2025-03-19 PROCEDURE — 81003 URINALYSIS AUTO W/O SCOPE: CPT | Performed by: STUDENT IN AN ORGANIZED HEALTH CARE EDUCATION/TRAINING PROGRAM

## 2025-03-19 PROCEDURE — 1159F MED LIST DOCD IN RCRD: CPT | Performed by: STUDENT IN AN ORGANIZED HEALTH CARE EDUCATION/TRAINING PROGRAM

## 2025-03-19 RX ORDER — CIPROFLOXACIN 500 MG/1
500 TABLET ORAL 2 TIMES DAILY
Qty: 10 TABLET | Refills: 0 | Status: SHIPPED | OUTPATIENT
Start: 2025-03-19 | End: 2025-03-24

## 2025-03-19 RX ORDER — PHENAZOPYRIDINE HYDROCHLORIDE 100 MG/1
100 TABLET, FILM COATED ORAL 3 TIMES DAILY PRN
Qty: 10 TABLET | Refills: 0 | Status: SHIPPED | OUTPATIENT
Start: 2025-03-19 | End: 2025-03-22

## 2025-03-19 RX ORDER — FERROUS SULFATE 325(65) MG
325 TABLET, DELAYED RELEASE (ENTERIC COATED) ORAL EVERY OTHER DAY
COMMUNITY

## 2025-03-19 ASSESSMENT — PATIENT HEALTH QUESTIONNAIRE - PHQ9
SUM OF ALL RESPONSES TO PHQ9 QUESTIONS 1 AND 2: 0
1. LITTLE INTEREST OR PLEASURE IN DOING THINGS: NOT AT ALL
2. FEELING DOWN, DEPRESSED OR HOPELESS: NOT AT ALL

## 2025-03-19 NOTE — PROGRESS NOTES
"Subjective   Patient ID: Giovanna Chahal is a 65 y.o. female who presents for UTI (Frequency, urgency, getting up atleast 2 times a night, burning when urinating /Symptoms started 2-3 days ago).  History of Present Illness  Giovanna Chahal is a 65 year old female who presents with urinary symptoms following a steroid injection.    She has been experiencing urinary symptoms for the past two to three days, characterized by dysuria that varies with hydration status and post-micturition spasms. No hematuria, back pain, fevers, chills, nausea, or vomiting. These symptoms began after receiving a deep joint steroid injection approximately two weeks ago. She has not had a urinary tract infection in many years, possibly around ten years ago.    Following the steroid injection, she experienced hoarseness, occasional bruising, and insomnia for two nights, which she attributes to the effects of the steroids.    Her allergies include tetracyclines, penicillins, and Bactrim, but she tolerates Cipro well. She has used Pyridium in the past to manage urinary symptoms and found it effective in alleviating spasming pain.    Review of Systems  ROS otherwise negative aside from what was mentioned above in HPI.    Objective     /70 (BP Location: Right arm, Patient Position: Sitting, BP Cuff Size: Adult)   Pulse 58   Ht 1.727 m (5' 8\")   Wt 67.1 kg (148 lb)   SpO2 98%   BMI 22.50 kg/m²        Current Outpatient Medications:     amLODIPine (Norvasc) 5 mg tablet, Take 1 tablet (5 mg) by mouth once daily., Disp: 90 tablet, Rfl: 3    cyclobenzaprine (Flexeril) 5 mg tablet, TAKE ONE-HALF TABLETS BY MOUTH EVERY DAY, Disp: 30 tablet, Rfl: 3    ferrous sulfate 325 (65 Fe) mg EC tablet, Take 1 tablet by mouth every other day. Do not crush, chew, or split., Disp: , Rfl:     furosemide (Lasix) 20 mg tablet, Take 1 tablet (20 mg) by mouth once daily as needed., Disp: , Rfl:     melatonin 1 mg tablet,chewable, Chew., Disp: , Rfl:     " meloxicam (Mobic) 15 mg tablet, TAKE ONE TABLET BY MOUTH EVERY DAY, Disp: 30 tablet, Rfl: 1    metoprolol succinate XL (Toprol-XL) 25 mg 24 hr tablet, Take 3 tablets (75 mg) by mouth once daily., Disp: 270 tablet, Rfl: 3    multivit with minerals-folic acid-denise K-CoQ (GASTON Plus) 200 mcg-1,000 mcg-10 mg capsule, Take 1 capsule by mouth once daily., Disp: , Rfl:     ciprofloxacin (Cipro) 500 mg tablet, Take 1 tablet (500 mg) by mouth 2 times a day for 5 days., Disp: 10 tablet, Rfl: 0    phenazopyridine (Pyridium) 100 mg tablet, Take 1 tablet (100 mg) by mouth 3 times a day as needed for bladder spasms for up to 3 days., Disp: 10 tablet, Rfl: 0    Physical Exam    Physical Exam  Vitals reviewed.   Constitutional:       General: She is not in acute distress.     Appearance: Normal appearance.   HENT:      Head: Normocephalic.   Eyes:      Pupils: Pupils are equal, round, and reactive to light.   Pulmonary:      Effort: Pulmonary effort is normal. No respiratory distress.   Musculoskeletal:         General: Normal range of motion.   Skin:     General: Skin is warm and dry.   Neurological:      Mental Status: She is alert. Mental status is at baseline.   Psychiatric:         Mood and Affect: Mood normal.         Behavior: Behavior normal.         Results  LABS  Urinalysis: Moderate leukocytes, no protein, no nitrites (03/19/2025)    Assessment & Plan  Urinary Tract Infection (UTI)  Symptoms include dysuria and post-micturition spasms, with no hematuria, flank pain, fever, or chills, suggesting cystitis without pyelonephritis. Urinalysis shows moderate leukocytes, no protein or nitrates. She has a remote history of UTIs, reducing the likelihood of antibiotic resistance. Allergic to tetracyclines, penicillins, and Bactrim; tolerates Cipro.  - Send urine sample for culture to confirm infection and assess antibiotic resistance  - Prescribe antibiotics to treat the UTI  - Prescribe Pyridium for urethral spasms and pain  -  Advise monitoring of symptoms and report if not improving    Steroid Injection Side Effects  Post-steroid injection, she experiences hoarseness, bruising, and insomnia, consistent with known side effects such as immune suppression and hyperglycemia, increasing infection susceptibility and sleep disturbances.  - Monitor for resolution of side effects  - Educate on potential side effects of steroid injections, including immune suppression and sleep disturbances    Farida Diaz, DO     This medical note was created with the assistance of artificial intelligence (AI) for documentation purposes. The content has been reviewed and confirmed by the healthcare provider for accuracy and completeness. Patient consented to the use of audio recording and use of AI during their visit.   Answers submitted by the patient for this visit:  Painful Urination Questionnaire (Submitted on 3/18/2025)  Chief Complaint: Dysuria  Chronicity: new  Onset: in the past 7 days  Frequency: every urination  Progression since onset: gradually worsening  Pain quality: burning  Pain severity: mild  Pain - numeric: 4/10  Fever: no fever  Sexually active?: No  History of pyelonephritis?: No  hematuria: No  chills: No  hesitancy: Yes  discharge: No  frequency: Yes  nausea: No  flank pain: No  possible pregnancy: No  urgency: Yes  sweats: No  vomiting: No

## 2025-03-22 LAB — BACTERIA UR CULT: ABNORMAL

## 2025-03-24 DIAGNOSIS — R39.9 UTI SYMPTOMS: Primary | ICD-10-CM

## 2025-03-24 RX ORDER — NITROFURANTOIN 25; 75 MG/1; MG/1
100 CAPSULE ORAL 2 TIMES DAILY
Qty: 10 CAPSULE | Refills: 0 | Status: SHIPPED | OUTPATIENT
Start: 2025-03-24 | End: 2025-03-29

## 2025-04-17 DIAGNOSIS — M25.512 CHRONIC LEFT SHOULDER PAIN: ICD-10-CM

## 2025-04-17 DIAGNOSIS — G89.29 CHRONIC LEFT SHOULDER PAIN: ICD-10-CM

## 2025-04-17 RX ORDER — MELOXICAM 15 MG/1
15 TABLET ORAL DAILY
Qty: 30 TABLET | Refills: 1 | Status: SHIPPED | OUTPATIENT
Start: 2025-04-17

## 2025-05-14 ENCOUNTER — OFFICE VISIT (OUTPATIENT)
Dept: ORTHOPEDIC SURGERY | Facility: CLINIC | Age: 65
End: 2025-05-14
Payer: MEDICARE

## 2025-05-14 ENCOUNTER — HOSPITAL ENCOUNTER (OUTPATIENT)
Dept: RADIOLOGY | Facility: EXTERNAL LOCATION | Age: 65
Discharge: HOME | End: 2025-05-14

## 2025-05-14 ENCOUNTER — HOSPITAL ENCOUNTER (OUTPATIENT)
Dept: RADIOLOGY | Facility: HOSPITAL | Age: 65
Discharge: HOME | End: 2025-05-14
Payer: MEDICARE

## 2025-05-14 DIAGNOSIS — M53.3 PAIN OF LEFT SACROILIAC JOINT: ICD-10-CM

## 2025-05-14 DIAGNOSIS — M25.552 LEFT HIP PAIN: ICD-10-CM

## 2025-05-14 DIAGNOSIS — M70.72 BURSITIS OF LEFT HIP, UNSPECIFIED BURSA: Primary | ICD-10-CM

## 2025-05-14 PROCEDURE — 99214 OFFICE O/P EST MOD 30 MIN: CPT | Mod: 25 | Performed by: FAMILY MEDICINE

## 2025-05-14 PROCEDURE — 73502 X-RAY EXAM HIP UNI 2-3 VIEWS: CPT | Mod: LT

## 2025-05-14 PROCEDURE — 1036F TOBACCO NON-USER: CPT | Performed by: FAMILY MEDICINE

## 2025-05-14 PROCEDURE — 1159F MED LIST DOCD IN RCRD: CPT | Performed by: FAMILY MEDICINE

## 2025-05-14 PROCEDURE — 1160F RVW MEDS BY RX/DR IN RCRD: CPT | Performed by: FAMILY MEDICINE

## 2025-05-14 PROCEDURE — 99214 OFFICE O/P EST MOD 30 MIN: CPT | Performed by: FAMILY MEDICINE

## 2025-05-14 PROCEDURE — 20611 DRAIN/INJ JOINT/BURSA W/US: CPT | Mod: LT | Performed by: FAMILY MEDICINE

## 2025-05-14 PROCEDURE — 2500000004 HC RX 250 GENERAL PHARMACY W/ HCPCS (ALT 636 FOR OP/ED): Performed by: FAMILY MEDICINE

## 2025-05-14 RX ORDER — LIDOCAINE HYDROCHLORIDE 20 MG/ML
2 INJECTION, SOLUTION INFILTRATION; PERINEURAL
Status: COMPLETED | OUTPATIENT
Start: 2025-05-14 | End: 2025-05-14

## 2025-05-14 RX ORDER — TRIAMCINOLONE ACETONIDE 40 MG/ML
40 INJECTION, SUSPENSION INTRA-ARTICULAR; INTRAMUSCULAR
Status: COMPLETED | OUTPATIENT
Start: 2025-05-14 | End: 2025-05-14

## 2025-05-14 RX ADMIN — LIDOCAINE HYDROCHLORIDE 2 ML: 20 INJECTION, SOLUTION INFILTRATION; PERINEURAL at 14:33

## 2025-05-14 RX ADMIN — TRIAMCINOLONE ACETONIDE 40 MG: 400 INJECTION, SUSPENSION INTRA-ARTICULAR; INTRAMUSCULAR at 14:33

## 2025-05-14 NOTE — PROGRESS NOTES
History of Present Illness   Chief Complaint   Patient presents with    Left Hip - Pain     FOR OVER 1 YEAR  NKI       The patient is 65 y.o. female  here with a complaint of left hip pain.  Initial onset of symptoms around 2 years ago, says initially pain was more prominent when she would go hiking if she went for more than 3 mile she will get pain and have to stop but was having less pain with day-to-day activity.  Over the last several months she is been having more consistent pain in her left hip with normal day-to-day activity, she has pain with walking, she has pain laying on her side at night.  She says pain is most prominent over the laying on her side at night.  She says pain is most prominent over the lateral and posterior aspect of her left hip, she will get some radiation of pain into the buttocks.  No further radiation of pain down the lower extremity.  She denies any significant low back pain, she denies any anterior hip or groin pain.  She takes Mobic daily, she takes diclofenac on occasion with minimal change in symptoms.  She does have some pain getting in and out of her car, no pain putting on her shoes and socks    Medical History[1]    Medication Documentation Review Audit       Reviewed by Isela Ramirez MA (Medical Assistant) on 05/14/25 at 1310      Medication Order Taking? Sig Documenting Provider Last Dose Status   amLODIPine (Norvasc) 5 mg tablet [id number]  Take 1 tablet (5 mg) by mouth once daily. Farida Diaz, DO  Active   cyclobenzaprine (Flexeril) 5 mg tablet [id number]  TAKE ONE-HALF TABLETS BY MOUTH EVERY DAY Farida Diaz, DO  Active   ferrous sulfate 325 (65 Fe) mg EC tablet [id number]  Take 1 tablet by mouth every other day. Do not crush, chew, or split. Historical Provider, MD  Active   furosemide (Lasix) 20 mg tablet [id number] No Take 1 tablet (20 mg) by mouth once daily as needed. Historical Provider, MD Taking Active   melatonin 1 mg tablet,chewable [id number] No Chew.  Historical Provider, MD Taking Active   meloxicam (Mobic) 15 mg tablet [id number]  TAKE ONE TABLET BY MOUTH EVERY DAY Farida Diaz, DO  Active   metoprolol succinate XL (Toprol-XL) 25 mg 24 hr tablet [id number]  Take 3 tablets (75 mg) by mouth once daily. Farida Diaz, DO  Active   multivit with minerals-folic acid-denise K-CoQ (GASTON Plus) 200 mcg-1,000 mcg-10 mg capsule [id number] No Take 1 capsule by mouth once daily. Historical Provider, MD Taking Active                    RX Allergies[2]    Social History     Socioeconomic History    Marital status:      Spouse name: Not on file    Number of children: Not on file    Years of education: Not on file    Highest education level: Not on file   Occupational History    Not on file   Tobacco Use    Smoking status: Never    Smokeless tobacco: Never   Substance and Sexual Activity    Alcohol use: Not on file    Drug use: Not on file    Sexual activity: Not on file   Other Topics Concern    Not on file   Social History Narrative    Not on file     Social Drivers of Health     Financial Resource Strain: Not on file   Food Insecurity: Not on file   Transportation Needs: Not on file   Physical Activity: Not on file   Stress: Not on file   Social Connections: Not on file   Intimate Partner Violence: Not on file   Housing Stability: Not on file       Surgical History[3]       Review of Systems   GENERAL: Negative  GI: Negative  MUSCULOSKELETAL: See HPI  SKIN: Negative  NEURO:  Negative     Physical Exam:    General/Constitutional: well appearing, no distress, appears stated age  HEENT: sclera clear  Respiratory: non labored breathing  Vascular: No edema, swelling or tenderness, except as noted in detailed exam.  Integumentary: No impressive skin lesions present, except as noted in detailed exam.  Neurological:  Alert and oriented   Psychological:  Normal mood and affect.  Musculoskeletal: Normal, except as noted in detailed exam and in HPI.  Normal gait,  unassisted      Left hip: Normal appearance, no rotational deformity or leg length discrepancy.  There is some tenderness to palpation of the left hip somewhat diffusely, she has tenderness at the greater trochanteric, there is tenderness along the gluteus medius and minimus muscle bellies, there is SI joint tenderness to palpation.  There is no tenderness at the lower lumbar paraspinal muscles or midline low back tenderness.  She has good range of motion of the hip with flexion, internal and external rotation without exacerbation of pain.  No motor deficits present at the hip.  Negative Stinchfield test.     Imaging: X-rays of left hip obtained today and independently reviewed, there is a cam morphology of the left femoral neck, joint space is preserved.  No acute abnormalities are seen.    L Inj/Asp: L greater trochanteric bursa on 5/14/2025 2:33 PM  Indications: pain  Details: 22 G needle, ultrasound-guided lateral approach  Medications: 40 mg triamcinolone acetonide 40 mg/mL; 2 mL lidocaine 20 mg/mL (2 %)  Outcome: tolerated well, no immediate complications    Left hip bursa and surrounding structures were appropriately visualized before and during injection    Ultrasound images were permanently uploaded to the medical record/PACS  Procedure, treatment alternatives, risks and benefits explained, specific risks discussed. Consent was given by the patient. Immediately prior to procedure a time out was called to verify the correct patient, procedure, equipment, support staff and site/side marked as required. Patient was prepped and draped in the usual sterile fashion.               Assessment   1. Bursitis of left hip, unspecified bursa  Referral to Physical Therapy      2. Pain of left sacroiliac joint  Referral to Physical Therapy      3. Left hip pain  XR hip left with pelvis when performed 2 or 3 views    Point of Care Ultrasound    Referral to Physical Therapy            Plan: Left hip pain, thought to be  multifactorial in nature, does have some focal tenderness at the greater trochanter as well as some pain along the gluteus muscle bellies and SI joint.  We discussed further workup and treatment.  We did proceed with ultrasound-guided left hip trochanteric bursa injection with Kenalog today, she tolerated without issue.  I do think she would benefit from some physical therapy given some more diffuse nature of her symptoms, referral was placed, she will call to schedule.  She will plan to follow-up as symptoms dictate.         [1] No past medical history on file.  [2]   Allergies  Allergen Reactions    Lisinopril Cough    Influenza Virus Vaccine, Specific Other    Tetracyclines Other     experiences hearing loss    Penicillins Rash    Sulfamethoxazole-Trimethoprim Dermatitis, Rash and Other   [3] No past surgical history on file.